# Patient Record
Sex: FEMALE | Race: WHITE | NOT HISPANIC OR LATINO | Employment: FULL TIME | URBAN - METROPOLITAN AREA
[De-identification: names, ages, dates, MRNs, and addresses within clinical notes are randomized per-mention and may not be internally consistent; named-entity substitution may affect disease eponyms.]

---

## 2023-11-29 NOTE — PROGRESS NOTES
"Subjective   Reason for Visit: Melony Shelton is an 28 y.o. female here for a CPE     Chief Complaint   Patient presents with    New Patient Visit     KIMBERLI IS HERE TODAY FOR [facility] TO ESTABLISH CARE, PREVIOUSLY SEEN BY DR. FIGUEROA. PT WOULD LIKE TO DISCUSS WEIGHT MANAGEMENT OPTIONS.   PT ALSO HAS C/O \"BRUISING EASILY\"        HPI    Melony is 29 yo female presenting today as new pt to [facility] care  Previous PCP: Dr. Figueroa; LOV 2022    Pt is   No children   Works at [employer]     Pt sees GYN, Dr. Montiel  Had Nexplanon removed in Feb 2023  Started Depo, but stopped injection as of May 2023  Heavy periods  Hirsutism  Had labs done in Feb 2023 per GYN, no US   Pt feels she has PCOS     Do you take any herbs or supplements that were not prescribed by a doctor? Denies   LMP: 11/6/2023  PAP: 2022  GYN: Dr. Montiel  Fasting blood work:   Last eye exam: 2023  Last dental Exam: 2023  Exercise: home exercise infrequently   Mood: fluctuates   Sleep: wakes up frequently, has HANY, struggles w/CPAP  Diet:  lactose intolerant  Do you have pain that bothers you in your daily life? No     Patient Care Team:  Kimberli Figueroa MD as PCP - General     Review of Systems  All 13 systems were reviewed and are within normal limits except positive and pertinent negative responses which are noted below or in HPI.      Objective   Vitals:  /86   Pulse 85   Ht 1.753 m (5' 9\")   Wt 110 kg (242 lb)   BMI 35.74 kg/m²       No current outpatient medications    Physical Exam  Vitals reviewed.   HENT:      Mouth/Throat:      Mouth: Mucous membranes are moist.   Eyes:      Conjunctiva/sclera: Conjunctivae normal.   Cardiovascular:      Pulses: Normal pulses.      Heart sounds: Normal heart sounds.   Pulmonary:      Effort: Pulmonary effort is normal.      Breath sounds: Normal breath sounds.   Abdominal:      General: Bowel sounds are normal.   Skin:     General: Skin is warm and dry.      Capillary Refill: Capillary refill takes less " than 2 seconds.   Neurological:      Mental Status: She is alert.   Psychiatric:         Mood and Affect: Mood normal.         Assessment/Plan   Problem List Items Addressed This Visit    None  Visit Diagnoses         Codes    Annual physical exam    -  Primary Z00.00    Class 2 obesity due to excess calories without serious comorbidity with body mass index (BMI) of 35.0 to 35.9 in adult     E66.09, Z68.35    Relevant Orders    Comprehensive Metabolic Panel    Lipid Panel    Vitamin D 25-Hydroxy,Total (for eval of Vitamin D levels)    Encounter to establish care     Z76.89

## 2023-11-30 ENCOUNTER — OFFICE VISIT (OUTPATIENT)
Dept: PRIMARY CARE | Facility: CLINIC | Age: 28
End: 2023-11-30
Payer: COMMERCIAL

## 2023-11-30 VITALS
BODY MASS INDEX: 35.84 KG/M2 | SYSTOLIC BLOOD PRESSURE: 120 MMHG | HEIGHT: 69 IN | HEART RATE: 85 BPM | DIASTOLIC BLOOD PRESSURE: 86 MMHG | WEIGHT: 242 LBS

## 2023-11-30 DIAGNOSIS — Z76.89 ENCOUNTER TO ESTABLISH CARE: ICD-10-CM

## 2023-11-30 DIAGNOSIS — E66.09 CLASS 2 OBESITY DUE TO EXCESS CALORIES WITHOUT SERIOUS COMORBIDITY WITH BODY MASS INDEX (BMI) OF 35.0 TO 35.9 IN ADULT: ICD-10-CM

## 2023-11-30 DIAGNOSIS — Z00.00 ANNUAL PHYSICAL EXAM: Primary | ICD-10-CM

## 2023-11-30 PROBLEM — R87.612 LGSIL ON PAP SMEAR OF CERVIX: Status: ACTIVE | Noted: 2023-11-30

## 2023-11-30 PROBLEM — N93.9 ABNORMAL UTERINE BLEEDING (AUB): Status: ACTIVE | Noted: 2023-11-30

## 2023-11-30 PROBLEM — R87.810 CERVICAL HIGH RISK HUMAN PAPILLOMAVIRUS (HPV) DNA TEST POSITIVE: Status: ACTIVE | Noted: 2023-11-30

## 2023-11-30 PROBLEM — M43.02 CERVICAL SPONDYLOLYSIS: Status: ACTIVE | Noted: 2023-11-30

## 2023-11-30 PROBLEM — F41.8 ANXIETY ASSOCIATED WITH DEPRESSION: Status: ACTIVE | Noted: 2023-11-30

## 2023-11-30 PROCEDURE — 99395 PREV VISIT EST AGE 18-39: CPT | Performed by: NURSE PRACTITIONER

## 2023-11-30 PROCEDURE — 3008F BODY MASS INDEX DOCD: CPT | Performed by: NURSE PRACTITIONER

## 2023-11-30 NOTE — PATIENT INSTRUCTIONS
Thank you for seeing me today.  It was a pleasure to meet you!    Today we did your Annual Physical Exam and discussed the following:     Lab work ordered today.  Please have your blood drawn in the next 1-2 weeks.  You need to be FASTING for 12 hours prior to blood draw.  You may only have water.  Please contact your insurance company to ask about the best location to get blood drawn.  We will contact you with the results of your blood work and any necessary adjustments  to your plan of care, if you do not hear from us within 3-5 days of having your blood drawn, please call the office at [phone].    Discussed with patient benefits of a low carbohydrate, low sodium, 5360-8097 calorie diet.    Each meal should have 3-4 oz of protein & a vegetable   Most healthy diets include plenty of fruits, vegetables, fish and whole grains.  Avoid foods high in unhealthy fats.   Meal plan and avoid skipping meals    Drink at least 64 oz of water per day, avoid beverages high in sugar, like sodas.  Beverages like Propel, Vitamin Water Zero, Sobe Lifewater, Crystal Light are good choices too.  Do not consume more than 2 alcoholic beverages per day.     Apps on your phone/tablet can be helpful:  Weight Watchers  Calorie Vishal   My fitness Pal  Lose it     Healthy exercises include brisk walking, bide riding and swimming, at least 30 min everyday.    RTC ANNUALLY AND AS NEEDED

## 2023-12-12 ENCOUNTER — LAB (OUTPATIENT)
Dept: LAB | Facility: LAB | Age: 28
End: 2023-12-12
Payer: COMMERCIAL

## 2023-12-12 DIAGNOSIS — E66.09 CLASS 2 OBESITY DUE TO EXCESS CALORIES WITHOUT SERIOUS COMORBIDITY WITH BODY MASS INDEX (BMI) OF 35.0 TO 35.9 IN ADULT: ICD-10-CM

## 2023-12-12 LAB
25(OH)D3 SERPL-MCNC: 8 NG/ML (ref 30–100)
ALBUMIN SERPL BCP-MCNC: 4.4 G/DL (ref 3.4–5)
ALP SERPL-CCNC: 88 U/L (ref 33–110)
ALT SERPL W P-5'-P-CCNC: 19 U/L (ref 7–45)
ANION GAP SERPL CALC-SCNC: 11 MMOL/L (ref 10–20)
AST SERPL W P-5'-P-CCNC: 15 U/L (ref 9–39)
BILIRUB SERPL-MCNC: 0.4 MG/DL (ref 0–1.2)
BUN SERPL-MCNC: 10 MG/DL (ref 6–23)
CALCIUM SERPL-MCNC: 8.9 MG/DL (ref 8.6–10.3)
CHLORIDE SERPL-SCNC: 106 MMOL/L (ref 98–107)
CHOLEST SERPL-MCNC: 156 MG/DL (ref 0–199)
CHOLESTEROL/HDL RATIO: 4.3
CO2 SERPL-SCNC: 26 MMOL/L (ref 21–32)
CREAT SERPL-MCNC: 0.69 MG/DL (ref 0.5–1.05)
GFR SERPL CREATININE-BSD FRML MDRD: >90 ML/MIN/1.73M*2
GLUCOSE SERPL-MCNC: 104 MG/DL (ref 74–99)
HDLC SERPL-MCNC: 36.2 MG/DL
LDLC SERPL CALC-MCNC: 105 MG/DL
NON HDL CHOLESTEROL: 120 MG/DL (ref 0–149)
POTASSIUM SERPL-SCNC: 4 MMOL/L (ref 3.5–5.3)
PROT SERPL-MCNC: 6.8 G/DL (ref 6.4–8.2)
SODIUM SERPL-SCNC: 139 MMOL/L (ref 136–145)
TRIGL SERPL-MCNC: 75 MG/DL (ref 0–149)
VLDL: 15 MG/DL (ref 0–40)

## 2023-12-12 PROCEDURE — 82306 VITAMIN D 25 HYDROXY: CPT

## 2023-12-12 PROCEDURE — 36415 COLL VENOUS BLD VENIPUNCTURE: CPT

## 2023-12-12 PROCEDURE — 80053 COMPREHEN METABOLIC PANEL: CPT

## 2023-12-12 PROCEDURE — 80061 LIPID PANEL: CPT

## 2023-12-13 DIAGNOSIS — E55.9 VITAMIN D DEFICIENCY: Primary | ICD-10-CM

## 2023-12-13 RX ORDER — ERGOCALCIFEROL 1.25 MG/1
50000 CAPSULE ORAL
Qty: 12 CAPSULE | Refills: 0 | Status: SHIPPED | OUTPATIENT
Start: 2023-12-13 | End: 2024-03-06

## 2023-12-13 NOTE — RESULT ENCOUNTER NOTE
Melony,   Your labs look pretty good, except you Vitamin D is WAY LOW!! I sent a prescription to your pharmacy on file. This is to be taken once per week for 12 weeks, then you should take at least 2000 Vitamin D once daily.   Any questions, please call.

## 2024-01-03 ENCOUNTER — TELEPHONE (OUTPATIENT)
Dept: OBSTETRICS AND GYNECOLOGY | Facility: CLINIC | Age: 29
End: 2024-01-03
Payer: COMMERCIAL

## 2024-02-12 ENCOUNTER — OFFICE VISIT (OUTPATIENT)
Dept: OBSTETRICS AND GYNECOLOGY | Facility: CLINIC | Age: 29
End: 2024-02-12
Payer: COMMERCIAL

## 2024-02-12 VITALS
BODY MASS INDEX: 33.03 KG/M2 | SYSTOLIC BLOOD PRESSURE: 114 MMHG | HEIGHT: 69 IN | WEIGHT: 223 LBS | DIASTOLIC BLOOD PRESSURE: 82 MMHG

## 2024-02-12 DIAGNOSIS — Z01.419 WELL WOMAN EXAM WITH ROUTINE GYNECOLOGICAL EXAM: Primary | ICD-10-CM

## 2024-02-12 DIAGNOSIS — N87.0 DYSPLASIA OF CERVIX, LOW GRADE (CIN 1): ICD-10-CM

## 2024-02-12 PROCEDURE — 88175 CYTOPATH C/V AUTO FLUID REDO: CPT

## 2024-02-12 PROCEDURE — 87624 HPV HI-RISK TYP POOLED RSLT: CPT

## 2024-02-12 PROCEDURE — 3008F BODY MASS INDEX DOCD: CPT | Performed by: OBSTETRICS & GYNECOLOGY

## 2024-02-12 PROCEDURE — 99395 PREV VISIT EST AGE 18-39: CPT | Performed by: OBSTETRICS & GYNECOLOGY

## 2024-02-12 PROCEDURE — 88141 CYTOPATH C/V INTERPRET: CPT | Performed by: PATHOLOGY

## 2024-02-12 PROCEDURE — 1036F TOBACCO NON-USER: CPT | Performed by: OBSTETRICS & GYNECOLOGY

## 2024-02-12 NOTE — PROGRESS NOTES
"  HPI  Melony Shelton is a 28 y.o. G0 presents for annual GYN well woman exam.   No acute concerns  birth control: not sexually active at this time    Annual Gyn Exam:  - last pap, pap hx: h/o LEEP 2022. Pap collected  9/2022 LEEP TIFFANIE-1  7/2022, TIFFANIE-1 on biopsy and ECC.   5/2022 LSIL and ASC-H. 5/2020 abnormal pap, no biopsies required.   - last mammogram, breast hx: n/a  - breast abnormalities: negative for lumps, skin changes, nipple discharge, pain  - last colonoscopy: n/a  - last DEXA scan: n/a     OB hx: G0  GYN hx:   - menarche, menstrual pattern, LMP: regular monthly menses   - Sexual activity/issues, STI protection/history, birth control: not sexually active at this time  - vaginal/vulvar lesions, irritation, or abnormal discharge: no, except as listed above  - HPV vaccine: complete  FH: mom DCIS dx in her 60s, and also ?uterine ca (pt uncertain if this was endometrial or cervical or what type of uterine CA it was). Genetic testing not done. mat aunt dx colon CA in early 50s. CA in her 50s. No other GYN related cancers including breast, ovarian, endometrial, or colon cancer.        ROS notable for generally healthy  10 point ROS negative except as listed above.     Physical exam  /82   Ht 1.753 m (5' 9\")   Wt 101 kg (223 lb)   LMP 01/17/2024 (Exact Date)   BMI 32.93 kg/m²      Physical Exam  Constitutional:       Appearance: Normal appearance.   HENT:      Head: Normocephalic and atraumatic.   Eyes:      Extraocular Movements: Extraocular movements intact.      Conjunctiva/sclera: Conjunctivae normal.      Pupils: Pupils are equal, round, and reactive to light.   Pulmonary:      Effort: Pulmonary effort is normal.   Chest:   Breasts:     Right: Normal.      Left: Normal.   Abdominal:      General: Abdomen is flat.      Palpations: Abdomen is soft.   Genitourinary:     General: Normal vulva.      Vagina: Normal.      Cervix: Normal.      Uterus: Normal.       Adnexa: Right adnexa normal and left " adnexa normal.   Skin:     General: Skin is warm and dry.   Neurological:      General: No focal deficit present.      Mental Status: She is alert.   Psychiatric:         Mood and Affect: Mood normal.         Behavior: Behavior normal.         Thought Content: Thought content normal.         Judgment: Judgment normal.       Assessment/Plan  Annual Gyn Exam:  - birth control: not sexually active at this time  - last pap, pap hx: pap collected  09/2022, LEEP TIFFANIE-1  07/2022, TIFFANIE-1 on biopsy and ECC   05/2022 LSIL and ASC-H. 5/2020 abnormal pap, no biopsies required.   - last mammogram, breast hx: n/a  - breast abnormalities: negative for lumps, skin changes, nipple discharge, pain  - last colonoscopy: n/a  - last DEXA scan: n/a  - HPV vaccine: complete      Scribe Attestation  By signing my name below, IDianna, Scribe   attest that this documentation has been prepared under the direction and in the presence of Juan Pablo Montiel MD.

## 2024-02-27 LAB
CYTOLOGY CMNT CVX/VAG CYTO-IMP: NORMAL
HPV HR 12 DNA GENITAL QL NAA+PROBE: NEGATIVE
HPV HR GENOTYPES PNL CVX NAA+PROBE: NEGATIVE
HPV16 DNA SPEC QL NAA+PROBE: NEGATIVE
HPV18 DNA SPEC QL NAA+PROBE: NEGATIVE
LAB AP HPV GENOTYPE QUESTION: YES
LAB AP HPV HR: NORMAL
LAB AP PREVIOUS ABNORMAL HISTORY: NORMAL
LABORATORY COMMENT REPORT: NORMAL
LMP START DATE: NORMAL
PATH REPORT.TOTAL CANCER: NORMAL

## 2024-08-06 NOTE — PROGRESS NOTES
"Subjective   Chief Complaint   Patient presents with    Ear Problem     Patient is coming in today for an issue with her left ear. Patient states it sounds like a bass drum. This has been going on for awhile. She states that she works at [employer] and it is so loud there and she feels she is slowly going deaf.        Patient ID: Melony Shelton is a 29 y.o. female who presents for Ear Problem (Patient is coming in today for an issue with her left ear. Patient states it sounds like a bass drum. This has been going on for awhile. She states that she works at [employer] and it is so loud there and she feels she is slowly going deaf. ).    HPI  Melony is a 30 yo female presenting today for c/o LEFT ear fullness/dull    LOV: NOV 2023      No Known Allergies    Review of Systems  ROS was completed and all systems are negative with the exception of what was noted in the the HPI.       Objective   /83   Pulse 86   Ht 1.753 m (5' 9\")   Wt 104 kg (230 lb 3.2 oz)   SpO2 98%   BMI 33.99 kg/m²      Current Outpatient Medications   Medication Instructions    ciprofloxacin-dexamethasone (CiproDEX) otic suspension 4 drops, Left Ear, 2 times daily         Physical Exam  Vitals reviewed.   HENT:      Ears:      Comments: Slight erythema noted to bottom of external ear canal.  No fluid  No bleeding  No drainage  Pulmonary:      Effort: Pulmonary effort is normal.   Neurological:      Mental Status: She is oriented to person, place, and time.         Assessment & Plan  Acute eczematoid otitis externa of left ear  Rx Cipro drops x 5 days     Orders:    ciprofloxacin-dexamethasone (CiproDEX) otic suspension; Administer 4 drops into the left ear 2 times a day for 5 days.        "

## 2024-08-07 ENCOUNTER — OFFICE VISIT (OUTPATIENT)
Dept: PRIMARY CARE | Facility: CLINIC | Age: 29
End: 2024-08-07
Payer: COMMERCIAL

## 2024-08-07 VITALS
DIASTOLIC BLOOD PRESSURE: 83 MMHG | WEIGHT: 230.2 LBS | HEIGHT: 69 IN | BODY MASS INDEX: 34.1 KG/M2 | SYSTOLIC BLOOD PRESSURE: 133 MMHG | OXYGEN SATURATION: 98 % | HEART RATE: 86 BPM

## 2024-08-07 DIAGNOSIS — H60.542 ACUTE ECZEMATOID OTITIS EXTERNA OF LEFT EAR: Primary | ICD-10-CM

## 2024-08-07 PROCEDURE — 1036F TOBACCO NON-USER: CPT | Performed by: NURSE PRACTITIONER

## 2024-08-07 PROCEDURE — 3008F BODY MASS INDEX DOCD: CPT | Performed by: NURSE PRACTITIONER

## 2024-08-07 PROCEDURE — 99212 OFFICE O/P EST SF 10 MIN: CPT | Performed by: NURSE PRACTITIONER

## 2024-08-07 RX ORDER — CIPROFLOXACIN AND DEXAMETHASONE 3; 1 MG/ML; MG/ML
4 SUSPENSION/ DROPS AURICULAR (OTIC) 2 TIMES DAILY
Qty: 7.5 ML | Refills: 0 | Status: SHIPPED | OUTPATIENT
Start: 2024-08-07 | End: 2024-08-12

## 2024-08-07 NOTE — PATIENT INSTRUCTIONS
Thank you for seeing me today.  It was a pleasure to see you again!    Try Cipro drops for LEFT ear canal redness  If no improvement, please call     For assistance with scheduling referrals or consultations, please call [phone] or [phone].    For laboratory, radiology, and other tests, please call [phone] ([phone] for pediatrics).   If you do not get results within 7-10 days, or you have any questions or concerns, please send a message, call the office ([phone]), or return to the office for a follow-up appointment.     For acute/sick visits, if you are unable to get an office visit, you can do a  On Demand Virtual Visit that is accessible via your My Chart account.  For emergencies, call 9-1-1 or go to the nearest Emergency Department.     Please schedule additional appointment(s) to address concern(s) not addressed today.    Please review prescription inserts and published information for possible adverse effects of all medications.     In general, results are discussed over the phone or via  PressPad.     You can see your health information, review clinical summaries from office visits & test results online when you follow your health with MY  Chart, a personal health record.   To sign up go to [email or web address].   If you need assistance with signing up or trouble getting into your account call PressPad Patient Line 24/7 at [phone]     [facility] AS NEEDED

## 2024-10-24 ENCOUNTER — PROCEDURE VISIT (OUTPATIENT)
Dept: OBSTETRICS AND GYNECOLOGY | Facility: CLINIC | Age: 29
End: 2024-10-24
Payer: COMMERCIAL

## 2024-10-24 VITALS — DIASTOLIC BLOOD PRESSURE: 72 MMHG | WEIGHT: 225.2 LBS | SYSTOLIC BLOOD PRESSURE: 124 MMHG | BODY MASS INDEX: 33.26 KG/M2

## 2024-10-24 DIAGNOSIS — Z30.017 NEXPLANON INSERTION: Primary | ICD-10-CM

## 2024-10-24 LAB — PREGNANCY TEST URINE, POC: NEGATIVE

## 2024-10-24 PROCEDURE — 11981 INSERTION DRUG DLVR IMPLANT: CPT | Performed by: ADVANCED PRACTICE MIDWIFE

## 2024-10-24 PROCEDURE — 2500000004 HC RX 250 GENERAL PHARMACY W/ HCPCS (ALT 636 FOR OP/ED): Performed by: ADVANCED PRACTICE MIDWIFE

## 2024-10-24 PROCEDURE — 81025 URINE PREGNANCY TEST: CPT | Performed by: ADVANCED PRACTICE MIDWIFE

## 2024-10-24 ASSESSMENT — ENCOUNTER SYMPTOMS
LOSS OF SENSATION IN FEET: 0
DEPRESSION: 0
OCCASIONAL FEELINGS OF UNSTEADINESS: 0

## 2024-10-24 ASSESSMENT — PAIN SCALES - GENERAL: PAINLEVEL_OUTOF10: 0-NO PAIN

## 2024-10-24 ASSESSMENT — PATIENT HEALTH QUESTIONNAIRE - PHQ9
2. FEELING DOWN, DEPRESSED OR HOPELESS: NOT AT ALL
1. LITTLE INTEREST OR PLEASURE IN DOING THINGS: NOT AT ALL
SUM OF ALL RESPONSES TO PHQ9 QUESTIONS 1 AND 2: 0

## 2024-10-24 NOTE — PROGRESS NOTES
Removal of contraception    Date/Time: 10/24/2024 4:12 PM    Performed by: KEKE Germain  Authorized by: KEKE Germain    Consent:     Consent obtained:  Written    Consent given by:  Patient    Procedural risks discussed:  Infection    Patient questions answered: yes      Patient agrees, verbalizes understanding, and wants to proceed: yes      Educational handouts given: no      Instructions and paperwork completed: no    Indication:     Indication: Insertion of non-biodegradable drug delivery implant    Pre-procedure:     Pre-procedure timeout performed: yes      Prepped with: povidone-iodine      Local anesthetic:  Xylocaine with epinephrine    The site was cleaned and prepped in a sterile fashion: yes    Procedure:     Procedure:  Insertion    Small stab incision was made in arm: no      Left/right:  Left    Preloaded contraceptive capsule trocar was placed subdermally: yes      Visualization of implant was obtained: yes      Contraceptive capsule was inserted and trocar removed: yes      Visualization of notch in stylet and palpation of device: yes      Palpation confirms placement by provider and patient: yes      Site was closed with steri-strips and pressure bandage applied: yes    Comments:      Preg test negative  Patient is on O-pill  Instructed to continue the O-Pill for 7 days or until her menses comes

## 2025-01-31 ENCOUNTER — APPOINTMENT (OUTPATIENT)
Dept: PRIMARY CARE | Facility: CLINIC | Age: 30
End: 2025-01-31
Payer: COMMERCIAL

## 2025-01-31 VITALS
HEART RATE: 76 BPM | HEIGHT: 69 IN | WEIGHT: 214 LBS | DIASTOLIC BLOOD PRESSURE: 82 MMHG | BODY MASS INDEX: 31.7 KG/M2 | SYSTOLIC BLOOD PRESSURE: 126 MMHG | OXYGEN SATURATION: 98 %

## 2025-01-31 DIAGNOSIS — Z00.00 ANNUAL PHYSICAL EXAM: Primary | ICD-10-CM

## 2025-01-31 DIAGNOSIS — H91.93 DECREASED HEARING OF BOTH EARS: ICD-10-CM

## 2025-01-31 DIAGNOSIS — Z11.59 ENCOUNTER FOR HEPATITIS C SCREENING TEST FOR LOW RISK PATIENT: ICD-10-CM

## 2025-01-31 DIAGNOSIS — Z23 NEED FOR VACCINATION: ICD-10-CM

## 2025-01-31 PROCEDURE — 1036F TOBACCO NON-USER: CPT | Performed by: NURSE PRACTITIONER

## 2025-01-31 PROCEDURE — 3008F BODY MASS INDEX DOCD: CPT | Performed by: NURSE PRACTITIONER

## 2025-01-31 PROCEDURE — 90471 IMMUNIZATION ADMIN: CPT | Performed by: NURSE PRACTITIONER

## 2025-01-31 PROCEDURE — 99395 PREV VISIT EST AGE 18-39: CPT | Performed by: NURSE PRACTITIONER

## 2025-01-31 PROCEDURE — 90715 TDAP VACCINE 7 YRS/> IM: CPT | Performed by: NURSE PRACTITIONER

## 2025-01-31 RX ORDER — ETONOGESTREL 68 MG/1
1 IMPLANT SUBCUTANEOUS ONCE
COMMUNITY

## 2025-01-31 ASSESSMENT — PROMIS GLOBAL HEALTH SCALE
EMOTIONAL_PROBLEMS: OFTEN
RATE_AVERAGE_PAIN: 3
RATE_QUALITY_OF_LIFE: GOOD
RATE_GENERAL_HEALTH: GOOD
RATE_MENTAL_HEALTH: FAIR
CARRYOUT_PHYSICAL_ACTIVITIES: COMPLETELY
CARRYOUT_SOCIAL_ACTIVITIES: GOOD
RATE_PHYSICAL_HEALTH: GOOD
RATE_AVERAGE_FATIGUE: MODERATE
RATE_SOCIAL_SATISFACTION: FAIR

## 2025-01-31 NOTE — PATIENT INSTRUCTIONS
Thank you for seeing me today Melony MUSTAFA Nikita, it was a pleasure to see you again!    Today we did your Annual Physical Exam and discussed the following:     See Audiology for hearing test    Lab work ordered today.  Please have your blood drawn in the next 1-2 weeks.  You need to be FASTING for 12 hours prior to blood draw.  You may only have water.  Please contact your insurance company to ask about the best location to get blood drawn.  We will contact you with the results of your blood work and any necessary adjustments  to your plan of care, if you do not hear from us within 3-5 days of having your blood drawn, please call the office at [phone].    For assistance with scheduling referrals or consultations, please call [phone] or [phone].    For laboratory, radiology, and other tests, please call [phone] ([phone] for pediatrics).   For laboratory locations, please visit [email or web address]   If you do not get results within 7-10 days, or you have any questions or concerns, please send a message, call the office ([phone]), or return to the office for a follow-up appointment.     For acute/sick visits, if you are unable to get an office visit, you can do a  On Demand Virtual Visit that is accessible via your My Chart account.  For emergencies, call 9-1-1 or go to the nearest Emergency Department.     Please schedule additional appointment(s) to address concern(s) not addressed today.    Please review prescription inserts and published information for possible adverse effects of all medications.     In general, results are discussed over the phone or via  Y[employer]urt3[employer] Enginehart.     You can see your health information, review clinical summaries from office visits & test results online when you follow your health with MY  Chart, a personal health record.   To sign up go to [email or web address].   If you need assistance with signing up or trouble  getting into your account call [facility] Patient Line 24/7 at [phone]     RTC     Happy New Year!    Amelia Kee NP

## 2025-01-31 NOTE — ASSESSMENT & PLAN NOTE
- Counseled on healthy diet and regular exercise  - Fall avoidance information provided  - Personalized prevention plan provided   - PAP UTD  - Vaccines; Tdap today   - FBW ordered   - Pt agreeable to HIV/HEP C screening tests     Orders:    Lipid Panel; Future    Comprehensive Metabolic Panel; Future    Vitamin D 25-Hydroxy,Total (for eval of Vitamin D levels); Future

## 2025-01-31 NOTE — PROGRESS NOTES
"Subjective   Reason for Visit: Melony Shelton is an 29 y.o. female here for a CPE     Chief Complaint   Patient presents with    Annual Exam     Melony Shelton is a 29 y.o. female is here today for a CPE. Patient reports No questions or concerns at this time.           HPI  Melony is a 30 yo female presenting today for Annual CPE  LOV: AUG 2024     Dx: Denies     Pt reports doing well today  She saw GYN last year, had PAP and started w/Nexplanon   No c/o today    Moved in with her boyfriend recently, going well, adjusting to further drive to work     #CPE   Occupation: FT at [employer]     Do you take any herbs or supplements that were not prescribed by a doctor? Denies     LMP: 10/2024  PAP: 2024  GYN: Dr. Montiel  Sexually active: yes   # Partners: 1   STI screening: declines    Fasting blood work: Due   HIV/HEP C Screening: Due    Last eye exam: 2024  Last dental Exam: 2024  Exercise: regularly   Mood: no concerns   Sleep: no concerns   Vaccines: declines flu       Health Maintenance Due   Topic Date Due    Hepatitis C Screening  Never done    Yearly Adult Physical  02/13/2025       Allergies   Allergen Reactions    Milk Containing Products (Dairy) Diarrhea, GI Upset and Nausea/vomiting               No visits with results within 60 Day(s) from this visit.   Latest known visit with results is:   Procedure Visit on 10/24/2024   Component Date Value Ref Range Status    Preg Test, Ur 10/24/2024 Negative  Negative Final         Patient Care Team:  FREDA Xiong as PCP - General (Family Medicine)  FREDA Xiong as PCP - [place] [facility] PCP     Review of Systems  ROS was completed and all systems are negative with the exception of what was noted in the the HPI.       Objective   Vitals:  /82   Pulse 76   Ht 1.753 m (5' 9\")   Wt 97.1 kg (214 lb)   SpO2 98%   BMI 31.60 kg/m²       Current Outpatient Medications   Medication Instructions    etonogestrel-eluting contraceptive " (Nexplanon) 68 mg implant implant 1 each, Once       Physical Exam  Vitals reviewed.   Cardiovascular:      Pulses: Normal pulses.      Heart sounds: Normal heart sounds.   Pulmonary:      Effort: Pulmonary effort is normal.      Breath sounds: Normal breath sounds.   Neurological:      Mental Status: She is alert and oriented to person, place, and time.   Psychiatric:         Mood and Affect: Mood normal.         Assessment & Plan  Annual physical exam  - Counseled on healthy diet and regular exercise  - Fall avoidance information provided  - Personalized prevention plan provided   - PAP UTD  - Vaccines; Tdap today   - FBW ordered   - Pt agreeable to HIV/HEP C screening tests     Orders:    Lipid Panel; Future    Comprehensive Metabolic Panel; Future    Vitamin D 25-Hydroxy,Total (for eval of Vitamin D levels); Future    Encounter for hepatitis C screening test for low risk patient    Orders:    Hepatitis C antibody; Future    Need for vaccination  Tdap today   Orders:    Tdap vaccine, age 7 years and older  (BOOSTRIX)    Decreased hearing of both ears  See Audiology for evaluation   Orders:    Referral to Audiology; Future

## 2025-02-13 ENCOUNTER — CLINICAL SUPPORT (OUTPATIENT)
Dept: AUDIOLOGY | Facility: CLINIC | Age: 30
End: 2025-02-13
Payer: COMMERCIAL

## 2025-02-13 DIAGNOSIS — H93.13 SUBJECTIVE TINNITUS OF BOTH EARS: ICD-10-CM

## 2025-02-13 DIAGNOSIS — H91.93 DECREASED HEARING OF BOTH EARS: ICD-10-CM

## 2025-02-13 PROCEDURE — 92557 COMPREHENSIVE HEARING TEST: CPT | Performed by: SOCIAL WORKER

## 2025-02-13 PROCEDURE — 92550 TYMPANOMETRY & REFLEX THRESH: CPT | Mod: 52 | Performed by: SOCIAL WORKER

## 2025-02-13 NOTE — PROGRESS NOTES
Name: Melony Shelton  YOB: 1995  Age: 29 y.o.    Date of Evaluation:  2/13/2025    History:  Reason for visit:  Melony Shelton is seen today at the request of FREDA Xiong   for an evaluation of hearing.  Patient complains of Hearing Loss.  Ms. Shelton reports a history of occupational noise exposure and difficulty hearing at work.  She further reports intermittent ringing in her ears and TMJ issues  She denies any ear surgeries, ear pain or falls    Evaluation:  Otoscopy revealed clear canals bilaterally  Immittance testing indicated type A normal tympanograms with normal ear canal volumes bilaterally  Ipsilateral acoustic reflexes were present at 500-4000 Hz bilaterally  The presence of acoustic reflexes within normal intensity limits is consistent with normal middle ear and brainstem function, and suggests that auditory sensitivity is not significantly impaired. Distortion Product Otoacoustic emissions were present at 6144-0674 Hz in the right ear and at 6920-1355 Hz in the left ear  DPOAEs assess cochlear outer hair functioning  Present DPOAEs suggest normal/near normal cochlear outer hair cell function and are consistent with no greater than a mild hearing loss at those frequencies. Absent DPOAEs are consistent with abnormal cochlear outer hair cell function and some degree of hearing loss at those frequencies.     Behavioral hearing testing indicated normal peripheral hearing at 125-8000 Hz bilaterally  Word recognition testing was completed using recorded speech at the patient's most comfortable level as documented on the audiogram.    Scores were 100% in the right ear and 92% in the left ear            Summary:  Today's results are consistent with normal peripheral hearing at 125-8000 Hz bilaterally  Word recognition is excellent at conversational loudness    Treatment Plan:  Follow up with PCP as directed  Use of hearing protection when exposed to loud sounds  Retest hearing  if a change is suspected

## 2025-10-07 ENCOUNTER — APPOINTMENT (OUTPATIENT)
Facility: CLINIC | Age: 30
End: 2025-10-07
Payer: COMMERCIAL

## 2026-01-30 ENCOUNTER — APPOINTMENT (OUTPATIENT)
Dept: PRIMARY CARE | Facility: CLINIC | Age: 31
End: 2026-01-30
Payer: COMMERCIAL